# Patient Record
Sex: FEMALE | Employment: OTHER | URBAN - METROPOLITAN AREA
[De-identification: names, ages, dates, MRNs, and addresses within clinical notes are randomized per-mention and may not be internally consistent; named-entity substitution may affect disease eponyms.]

---

## 2020-01-13 ENCOUNTER — OFFICE VISIT (OUTPATIENT)
Dept: FAMILY MEDICINE | Facility: CLINIC | Age: 65
End: 2020-01-13
Payer: COMMERCIAL

## 2020-01-13 ENCOUNTER — TELEPHONE (OUTPATIENT)
Dept: SURGERY | Facility: CLINIC | Age: 65
End: 2020-01-13

## 2020-01-13 VITALS
SYSTOLIC BLOOD PRESSURE: 168 MMHG | OXYGEN SATURATION: 99 % | BODY MASS INDEX: 23.73 KG/M2 | TEMPERATURE: 98.1 F | RESPIRATION RATE: 18 BRPM | HEART RATE: 70 BPM | HEIGHT: 64 IN | WEIGHT: 139 LBS | DIASTOLIC BLOOD PRESSURE: 65 MMHG

## 2020-01-13 DIAGNOSIS — Z00.00 ROUTINE GENERAL MEDICAL EXAMINATION AT A HEALTH CARE FACILITY: Primary | ICD-10-CM

## 2020-01-13 DIAGNOSIS — Z12.11 SCREEN FOR COLON CANCER: ICD-10-CM

## 2020-01-13 DIAGNOSIS — E78.5 HYPERLIPIDEMIA WITH TARGET LDL LESS THAN 130: ICD-10-CM

## 2020-01-13 DIAGNOSIS — Z83.3 FAMILY HISTORY OF DIABETES MELLITUS: ICD-10-CM

## 2020-01-13 DIAGNOSIS — I10 ESSENTIAL HYPERTENSION WITH GOAL BLOOD PRESSURE LESS THAN 140/90: ICD-10-CM

## 2020-01-13 DIAGNOSIS — Z12.31 ENCOUNTER FOR SCREENING MAMMOGRAM FOR BREAST CANCER: ICD-10-CM

## 2020-01-13 LAB
ALBUMIN SERPL-MCNC: 4 G/DL (ref 3.4–5)
ALP SERPL-CCNC: 68 U/L (ref 40–150)
ALT SERPL W P-5'-P-CCNC: 70 U/L (ref 0–50)
ANION GAP SERPL CALCULATED.3IONS-SCNC: 6 MMOL/L (ref 3–14)
AST SERPL W P-5'-P-CCNC: 37 U/L (ref 0–45)
BILIRUB SERPL-MCNC: 0.8 MG/DL (ref 0.2–1.3)
BUN SERPL-MCNC: 16 MG/DL (ref 7–30)
CALCIUM SERPL-MCNC: 8.7 MG/DL (ref 8.5–10.1)
CHLORIDE SERPL-SCNC: 106 MMOL/L (ref 94–109)
CHOLEST SERPL-MCNC: 302 MG/DL
CO2 SERPL-SCNC: 25 MMOL/L (ref 20–32)
CREAT SERPL-MCNC: 0.51 MG/DL (ref 0.52–1.04)
ERYTHROCYTE [DISTWIDTH] IN BLOOD BY AUTOMATED COUNT: 12.2 % (ref 10–15)
GFR SERPL CREATININE-BSD FRML MDRD: >90 ML/MIN/{1.73_M2}
GLUCOSE SERPL-MCNC: 94 MG/DL (ref 70–99)
HBA1C MFR BLD: 5.7 % (ref 0–5.6)
HCT VFR BLD AUTO: 42.3 % (ref 35–47)
HDLC SERPL-MCNC: 86 MG/DL
HGB BLD-MCNC: 13.8 G/DL (ref 11.7–15.7)
LDLC SERPL CALC-MCNC: 182 MG/DL
MCH RBC QN AUTO: 31 PG (ref 26.5–33)
MCHC RBC AUTO-ENTMCNC: 32.6 G/DL (ref 31.5–36.5)
MCV RBC AUTO: 95 FL (ref 78–100)
NONHDLC SERPL-MCNC: 216 MG/DL
PLATELET # BLD AUTO: 204 10E9/L (ref 150–450)
POTASSIUM SERPL-SCNC: 4.1 MMOL/L (ref 3.4–5.3)
PROT SERPL-MCNC: 7.8 G/DL (ref 6.8–8.8)
RBC # BLD AUTO: 4.45 10E12/L (ref 3.8–5.2)
SODIUM SERPL-SCNC: 137 MMOL/L (ref 133–144)
TRIGL SERPL-MCNC: 169 MG/DL
TSH SERPL DL<=0.005 MIU/L-ACNC: 3.02 MU/L (ref 0.4–4)
WBC # BLD AUTO: 4.7 10E9/L (ref 4–11)

## 2020-01-13 PROCEDURE — 36415 COLL VENOUS BLD VENIPUNCTURE: CPT | Performed by: NURSE PRACTITIONER

## 2020-01-13 PROCEDURE — 83036 HEMOGLOBIN GLYCOSYLATED A1C: CPT | Performed by: NURSE PRACTITIONER

## 2020-01-13 PROCEDURE — 84443 ASSAY THYROID STIM HORMONE: CPT | Performed by: NURSE PRACTITIONER

## 2020-01-13 PROCEDURE — 80061 LIPID PANEL: CPT | Performed by: NURSE PRACTITIONER

## 2020-01-13 PROCEDURE — 80053 COMPREHEN METABOLIC PANEL: CPT | Performed by: NURSE PRACTITIONER

## 2020-01-13 PROCEDURE — 99213 OFFICE O/P EST LOW 20 MIN: CPT | Mod: 25 | Performed by: NURSE PRACTITIONER

## 2020-01-13 PROCEDURE — 85027 COMPLETE CBC AUTOMATED: CPT | Performed by: NURSE PRACTITIONER

## 2020-01-13 PROCEDURE — 99386 PREV VISIT NEW AGE 40-64: CPT | Performed by: NURSE PRACTITIONER

## 2020-01-13 RX ORDER — LOSARTAN POTASSIUM 25 MG/1
25 TABLET ORAL DAILY
Qty: 30 TABLET | Refills: 0 | Status: SHIPPED | OUTPATIENT
Start: 2020-01-13 | End: 2020-03-06

## 2020-01-13 ASSESSMENT — PAIN SCALES - GENERAL: PAINLEVEL: NO PAIN (0)

## 2020-01-13 ASSESSMENT — MIFFLIN-ST. JEOR: SCORE: 1171.53

## 2020-01-13 NOTE — LETTER
[address]  [address]  [phone]    January 14, 2020      Carolynn Floyd  [address] [facility] [address]          Ms. Floyd,     Your LDL (bad cholesterol)  was above goal.  Genetics, diet, weight and low exercise levels can contribute to this. Your HDL (good cholesterol) was normal.  This is good. Your triglycerides were above normal.  Poor diet, genetics and being overweight can contribute to this.  1000mg daily of omega-3 fatty acids may improve this. Elevated LDL cholesterol and triglycerides as well as low HDL cholesterol all increase a person's risk for heart and vascular disease. Maintaining a healthy diet with lean proteins, whole grains and healthy fats such as olive oil as well as regular exercise and maintaining an appropriate weight all contribute to healthier cholesterol levels.  I recommend rechecking your cholesterol in about 4 months and if no improvement starting on medication.     Your A1c is slightly high, you are considered prediabetic.  Eating healthy and exercising can help improve this.     Please do not forget to follow-up in 1 month for follow-up of your blood pressure.     Please contact the clinic if you have additional questions.  Thank you.     Sincerely,     RACHEL Pope, NP-C   [facility]

## 2020-01-13 NOTE — PROGRESS NOTES
SUBJECTIVE:   CC: Carolynn Floyd is an 64 year old woman who presents for preventive health visit.     Healthy Habits:    Do you get at least three servings of calcium containing foods daily (dairy, green leafy vegetables, etc.)? yes    Amount of exercise or daily activities, outside of work: none outside of daily activity    Problems taking medications regularly not applicable    Medication side effects: No    Have you had an eye exam in the past two years? no    Do you see a dentist twice per year? yes    Do you have sleep apnea, excessive snoring or daytime drowsiness?no    Feels everyone has diabetes in her family.  There are also multiple people who have had strokes in her family including her father who is had about 3.    No calcium/vit d. Doesn't like taking pills.    Pap smears all normal in the past. Last 11/2016, not due yet  Mammogram due  Colonoscopy due  DEXA due    HTN: no headaches/dizziness. Not taking lisinopril. She states when she was taking the lisinopril she is getting throat itching and did not like how she felt.  So she did not take it for long after being prescribed a couple of years ago.  Her blood pressure recheck was still high.  We talked about needing to reduce her risk of stroke and heart attack by lowering her blood pressure.  Especially with a strong family history of stroke and diabetes in her family.  We are going to screen her today for diabetes.  She states that she verbalized understanding, encouraged patient to come back in 4 weeks for blood pressure follow-up.    She has stated a couple times throughout the visit that money is tight.  Advised to her that it may become more expensive for her if something occurs like a stroke then to take a daily blood pressure medication at this time.  Advised that was very important to her health to control her blood pressure.  Uncertain if she will take this medication and or follow-up in 4 weeks.    Has a slight cough today, breathing  okay.  No fever or chills. Some joint pain at times but this is likely unrelated to her slight cough.    Today's PHQ-2 Score:   PHQ-2 ( 1999 Pfizer) 1/13/2020 11/7/2016   Q1: Little interest or pleasure in doing things 0 0   Q2: Feeling down, depressed or hopeless 0 0   PHQ-2 Score 0 0       Abuse: Current or Past(Physical, Sexual or Emotional)- No  Do you feel safe in your environment? Yes    Have you ever done Advance Care Planning? (For example, a Health Directive, POLST, or a discussion with a medical provider or your loved ones about your wishes): No, advance care planning information given to patient to review.  Patient plans to discuss their wishes with loved ones or provider.      Social History     Tobacco Use     Smoking status: Never Smoker     Smokeless tobacco: Never Used   Substance Use Topics     Alcohol use: Yes     Comment: 1-2 glasses of wine on the weekends     If you drink alcohol do you typically have >3 drinks per day or >7 drinks per week? No                     Reviewed orders with patient.  Reviewed health maintenance and updated orders accordingly - Yes  Lab work is in process  Labs reviewed in [facility]    Mammogram Screening: Patient over age 50, mutual decision to screen reflected in health maintenance.    Pertinent mammograms are reviewed under the imaging tab.  History of abnormal Pap smear: NO - age 30-65 PAP every 5 years with negative HPV co-testing recommended  PAP / HPV Latest Ref Rng & Units 11/7/2016   PAP - NIL   HPV 16 DNA NEG Negative   HPV 18 DNA NEG Negative   OTHER HR HPV NEG Negative     Reviewed and updated as needed this visit by clinical staff  Tobacco  Allergies  Meds  Med Hx  Surg Hx  Fam Hx  Soc Hx        Reviewed and updated as needed this visit by Provider            ROS:  CONSTITUTIONAL: NEGATIVE for fever, chills, change in weight  INTEGUMENTARY/SKIN: NEGATIVE for worrisome rashes, moles or lesions  EYES: NEGATIVE for vision changes or irritation  ENT: NEGATIVE  "for ear, mouth and throat problems  RESP: NEGATIVE for significant cough or SOB  BREAST: NEGATIVE for masses, tenderness or discharge  CV: NEGATIVE for chest pain, palpitations or peripheral edema  GI: NEGATIVE for nausea, abdominal pain, heartburn, or change in bowel habits  : NEGATIVE for unusual urinary or vaginal symptoms. No vaginal bleeding.  MUSCULOSKELETAL: NEGATIVE for significant arthralgias or myalgia  NEURO: NEGATIVE for weakness, dizziness or paresthesias  PSYCHIATRIC: NEGATIVE for changes in mood or affect     OBJECTIVE:   BP (!) 197/100 (BP Location: Right arm, Patient Position: Chair, Cuff Size: Adult Regular)   Pulse 70   Temp 98.1  F (36.7  C) (Oral)   Resp 18   Ht 1.619 m (5' 3.75\")   Wt 63 kg (139 lb)   LMP  (Exact Date)   SpO2 99%   Breastfeeding No   BMI 24.05 kg/m    EXAM:  GENERAL: healthy, alert and no distress  EYES: Eyes grossly normal to inspection, PERRL and conjunctivae and sclerae normal  HENT: ear canals and TM's normal, nose and mouth without ulcers or lesions  NECK: no adenopathy, no asymmetry, masses, or scars and thyroid normal to palpation  RESP: lungs clear to auscultation - no rales, rhonchi or wheezes  BREAST: implants noted-slightly firm, without masses, tenderness or nipple discharge and no palpable axillary masses or adenopathy  CV: regular rate and rhythm, normal S1 S2, no S3 or S4, no murmur, click or rub  ABDOMEN: soft, nontender, no hepatosplenomegaly, no masses and bowel sounds normal  MS: no gross musculoskeletal defects noted, no edema  SKIN: no suspicious lesions or rashes  NEURO: Normal strength and tone, mentation intact and speech normal  PSYCH: mentation appears normal, affect normal/bright    Diagnostic Test Results:  Labs reviewed in Epic  No results found for this or any previous visit (from the past 24 hour(s)).    ASSESSMENT/PLAN:   1. Routine general medical examination at a health care facility  Normal exam  - CBC with platelets  - Lipid panel " "reflex to direct LDL Fasting    2. Essential hypertension with goal blood pressure less than 140/90  Repeat blood pressure is still very high.  She never was on the lisinopril very long back in 2016 but states that she had some throat itching with it.  Will start losartan 25 mg daily.  Advised her to follow-up in 4 weeks.  Advised her to call if this is not working well for her in the next week or two.  Could then consider something like amlodipine 5 mg daily.  Highly advised her to consider taking this medication as she had stated before she doesn't like taking medications.  Advised that it is much cheaper to take the medication then to worry about possible stroke or heart attack in the future from having high blood pressure for extensive amount of time.  She verbalized understanding, although unsure if she truly understood or not.    - Comprehensive metabolic panel (BMP + Alb, Alk Phos, ALT, AST, Total. Bili, TP)  - TSH with free T4 reflex  - losartan (COZAAR) 25 MG tablet; Take 1 tablet (25 mg) by mouth daily  Dispense: 30 tablet; Refill: 0    3. Family history of diabetes mellitus  Patient has strong family history of diabetes, will screen her A1c today.  - Hemoglobin A1c    4. Encounter for screening mammogram for breast cancer  Due for screening  - MA SCREENING DIGITAL BILAT - Future  (s+30); Future    5. Hyperlipidemia with target LDL less than 130  Screening, see above    6.  Screening for colon cancer  -Order placed to have colonoscopy.  Patient is due as she had adenomas noted in her last colonoscopy in November 2016.    COUNSELING:   Reviewed preventive health counseling, as reflected in patient instructions    Estimated body mass index is 24.05 kg/m  as calculated from the following:    Height as of this encounter: 1.619 m (5' 3.75\").    Weight as of this encounter: 63 kg (139 lb).         reports that she has never smoked. She has never used smokeless tobacco.      Counseling Resources:  ATP IV " Guidelines  Pooled Cohorts Equation Calculator  Breast Cancer Risk Calculator  FRAX Risk Assessment  ICSI Preventive Guidelines  Dietary Guidelines for Americans, 2010  USDA's MyPlate  ASA Prophylaxis  Lung CA Screening    Follow-up in 4 weeks for blood pressure check    RACHEL Pope, NP-C  [facility]    This chart was documented by provider using a voice activated software called Dragon in addition to manual typing. There may be vocabulary errors or other grammatical errors due to this.

## 2020-01-13 NOTE — TELEPHONE ENCOUNTER
[place] [facility]  Is this a cancellation or reschedule?  no  The name of the procedure: Colonoscopy  Provider scheduled with: Kandace  Date 1/21/20, Time 10:00am

## 2020-01-21 ENCOUNTER — HOSPITAL ENCOUNTER (OUTPATIENT)
Facility: AMBULATORY SURGERY CENTER | Age: 65
Discharge: HOME | End: 2020-01-21
Attending: SURGERY | Admitting: SURGERY
Payer: COMMERCIAL

## 2020-01-21 VITALS
HEART RATE: 61 BPM | OXYGEN SATURATION: 99 % | DIASTOLIC BLOOD PRESSURE: 83 MMHG | TEMPERATURE: 97.2 F | SYSTOLIC BLOOD PRESSURE: 122 MMHG | RESPIRATION RATE: 16 BRPM

## 2020-01-21 LAB — COLONOSCOPY: NORMAL

## 2020-01-21 PROCEDURE — 45385 COLONOSCOPY W/LESION REMOVAL: CPT | Mod: PT | Performed by: SURGERY

## 2020-01-21 PROCEDURE — 99152 MOD SED SAME PHYS/QHP 5/>YRS: CPT | Mod: 59 | Performed by: SURGERY

## 2020-01-21 PROCEDURE — G8907 PT DOC NO EVENTS ON DISCHARG: HCPCS

## 2020-01-21 PROCEDURE — 45380 COLONOSCOPY AND BIOPSY: CPT | Mod: XS

## 2020-01-21 PROCEDURE — 45385 COLONOSCOPY W/LESION REMOVAL: CPT

## 2020-01-21 PROCEDURE — 45380 COLONOSCOPY AND BIOPSY: CPT | Mod: 59 | Performed by: SURGERY

## 2020-01-21 PROCEDURE — 88305 TISSUE EXAM BY PATHOLOGIST: CPT | Performed by: SURGERY

## 2020-01-21 PROCEDURE — G8918 PT W/O PREOP ORDER IV AB PRO: HCPCS

## 2020-01-21 RX ORDER — ONDANSETRON 4 MG/1
4 TABLET, ORALLY DISINTEGRATING ORAL EVERY 6 HOURS PRN
Status: DISCONTINUED | OUTPATIENT
Start: 2020-01-21 | End: 2020-01-22 | Stop reason: HOSPADM

## 2020-01-21 RX ORDER — FENTANYL CITRATE 50 UG/ML
INJECTION, SOLUTION INTRAMUSCULAR; INTRAVENOUS PRN
Status: DISCONTINUED | OUTPATIENT
Start: 2020-01-21 | End: 2020-01-21 | Stop reason: HOSPADM

## 2020-01-21 RX ORDER — LIDOCAINE 40 MG/G
CREAM TOPICAL
Status: DISCONTINUED | OUTPATIENT
Start: 2020-01-21 | End: 2020-01-22 | Stop reason: HOSPADM

## 2020-01-21 RX ORDER — ONDANSETRON 2 MG/ML
4 INJECTION INTRAMUSCULAR; INTRAVENOUS EVERY 6 HOURS PRN
Status: DISCONTINUED | OUTPATIENT
Start: 2020-01-21 | End: 2020-01-22 | Stop reason: HOSPADM

## 2020-01-21 RX ORDER — FLUMAZENIL 0.1 MG/ML
0.2 INJECTION, SOLUTION INTRAVENOUS
Status: SHIPPED | OUTPATIENT
Start: 2020-01-21 | End: 2020-01-21

## 2020-01-21 RX ORDER — NALOXONE HYDROCHLORIDE 0.4 MG/ML
.1-.4 INJECTION, SOLUTION INTRAMUSCULAR; INTRAVENOUS; SUBCUTANEOUS
Status: DISCONTINUED | OUTPATIENT
Start: 2020-01-21 | End: 2020-01-22 | Stop reason: HOSPADM

## 2020-01-21 RX ORDER — ONDANSETRON 2 MG/ML
4 INJECTION INTRAMUSCULAR; INTRAVENOUS
Status: DISCONTINUED | OUTPATIENT
Start: 2020-01-21 | End: 2020-01-22 | Stop reason: HOSPADM

## 2020-01-23 LAB — COPATH REPORT: NORMAL

## 2020-03-03 DIAGNOSIS — I10 ESSENTIAL HYPERTENSION WITH GOAL BLOOD PRESSURE LESS THAN 140/90: ICD-10-CM

## 2020-03-03 NOTE — TELEPHONE ENCOUNTER
"Requested Prescriptions   Pending Prescriptions Disp Refills     losartan (COZAAR) 25 MG tablet  Last Written Prescription Date:  1/13/20  Last Fill Quantity: 30 tablet,  # refills: 0   Last office visit: 1/13/2020 with prescribing provider:  Sheila Jimenez NP   Future Office Visit:   30 tablet 0     Sig: Take 1 tablet (25 mg) by mouth daily       Angiotensin-II Receptors Failed - 3/3/2020 11:36 AM        Failed - Normal serum creatinine on file in past 12 months     Recent Labs   Lab Test 01/13/20  0836   CR 0.51*             Passed - Last blood pressure under 140/90 in past 12 months     BP Readings from Last 3 Encounters:   01/21/20 122/83   01/13/20 (!) 168/65   11/16/16 144/90                 Passed - Recent (12 mo) or future (30 days) visit within the authorizing provider's specialty     Patient has had an office visit with the authorizing provider or a provider within the authorizing providers department within the previous 12 mos or has a future within next 30 days. See \"Patient Info\" tab in inbasket, or \"Choose Columns\" in Meds & Orders section of the refill encounter.              Passed - Medication is active on med list        Passed - Patient is age 18 or older        Passed - No active pregnancy on record        Passed - Normal serum potassium on file in past 12 months     Recent Labs   Lab Test 01/13/20  0836   POTASSIUM 4.1                    Passed - No positive pregnancy test in past 12 months          "

## 2020-03-03 NOTE — LETTER
[facility]  [address] [place] [address]  [address]  [phone]    March 9, 2020      Carolynn Floyd  [address] [place] [address]      Dear Carolynn,    We have refilled your losartan for 15 days.   We will need to see you for an office visit before any additional refills can be given.  Please call [phone] to schedule this appointment.      Thank you,    [facility]      Thank you,      Sheila Jimenez NP

## 2020-03-06 RX ORDER — LOSARTAN POTASSIUM 25 MG/1
25 TABLET ORAL DAILY
Qty: 15 TABLET | Refills: 0 | Status: SHIPPED | OUTPATIENT
Start: 2020-03-06 | End: 2020-04-20

## 2020-03-06 NOTE — TELEPHONE ENCOUNTER
Sent small refill, please call patient advise she is overdue for follow up in person with a provider as her blood pressure was very high and concerning in January.  RACHEL Pope, NP-C  [facility]

## 2020-03-06 NOTE — TELEPHONE ENCOUNTER
Routing refill request to provider for review/approval because:  New medication started on 1/13/20, only given #30 tabs so would have been out by mid February. Was advised to return for BP check and appears has not done so as of yet.    Haliegh Marinelli RN  [facility]

## 2020-03-06 NOTE — TELEPHONE ENCOUNTER
This writer attempted to contact pt via Ugandan interpretor  on 03/06/20      Reason for call schedule OV for further refill, small refill sent and left message.      If patient calls back:   Schedule Office Visit appointment within 2 weeks with primary care, document that pt called and close encounter         Janessa Maki

## 2020-04-20 ENCOUNTER — VIRTUAL VISIT (OUTPATIENT)
Dept: FAMILY MEDICINE | Facility: CLINIC | Age: 65
End: 2020-04-20
Payer: COMMERCIAL

## 2020-04-20 DIAGNOSIS — I10 ESSENTIAL HYPERTENSION WITH GOAL BLOOD PRESSURE LESS THAN 140/90: ICD-10-CM

## 2020-04-20 PROCEDURE — 99213 OFFICE O/P EST LOW 20 MIN: CPT | Mod: 95 | Performed by: NURSE PRACTITIONER

## 2020-04-20 RX ORDER — LOSARTAN POTASSIUM 25 MG/1
25 TABLET ORAL DAILY
Qty: 15 TABLET | Refills: 0 | Status: CANCELLED | OUTPATIENT
Start: 2020-04-20

## 2020-04-20 RX ORDER — LOSARTAN POTASSIUM 25 MG/1
25 TABLET ORAL DAILY
Qty: 90 TABLET | Refills: 0 | Status: SHIPPED | OUTPATIENT
Start: 2020-04-20 | End: 2020-07-16

## 2020-04-20 NOTE — PATIENT INSTRUCTIONS
Continue losartan 25 mg daily-3 month refill sent to your pharmacy    Check blood pressure/heart rate when you  your medication, call provider with those numbers    Make phone appointment in 1 month, if the COVID-19 pandemic is settled down in [place], we will change this to in-person, but for now we will do phone visit    Call if your heart rating worsens or you get dizzy/headaches/fever/chills or call if you have any other questions

## 2020-04-20 NOTE — PROGRESS NOTES
"Carolynn Floyd is a 64 year old female who is being evaluated via a billable telephone visit.      The patient has been notified of following:     \"This telephone visit will be conducted via a call between you and your physician/provider. We have found that certain health care needs can be provided without the need for a physical exam.  This service lets us provide the care you need with a short phone conversation.  If a prescription is necessary we can send it directly to your pharmacy.  If lab work is needed we can place an order for that and you can then stop by our lab to have the test done at a later time.    Telephone visits are billed at different rates depending on your insurance coverage. During this emergency period, for some insurers they may be billed the same as an in-person visit.  Please reach out to your insurance provider with any questions.    If during the course of the call the physician/provider feels a telephone visit is not appropriate, you will not be charged for this service.\"    Patient has given verbal consent for Telephone visit?  Yes    How would you like to obtain your AVS? Mail a copy    Subjective     Carolynn Floyd is a 64 year old female who presents to clinic today for the following health issues:    HTN recheck: a little cough.  No headache, dizziness, no chest pain, no SOB. Sometimes her heart feels like it is racing. Bowel/bladder normal. No fever/cough      Patient Active Problem List   Diagnosis     CARDIOVASCULAR SCREENING; LDL GOAL LESS THAN 160     Essential hypertension with goal blood pressure less than 140/90     Hyperlipidemia with target LDL less than 130     Past Surgical History:   Procedure Laterality Date     COLONOSCOPY      2009     COLONOSCOPY WITH CO2 INSUFFLATION N/A 11/9/2016    Procedure: COLONOSCOPY WITH CO2 INSUFFLATION;  Surgeon: Duane, William Charles, MD;  Location:  OR       Social History     Tobacco Use     Smoking status: Never Smoker     " Smokeless tobacco: Never Used   Substance Use Topics     Alcohol use: Yes     Comment: 1-2 glasses of wine on the weekends     Family History   Problem Relation Age of Onset     Hypertension Father      Diabetes Father      Cerebrovascular Disease Father      Cerebrovascular Disease Paternal Grandmother      Thyroid Disease Daughter      Diabetes Sister      Colon Cancer No family hx of      Breast Cancer No family hx of          Current Outpatient Medications   Medication Sig Dispense Refill     losartan (COZAAR) 25 MG tablet Take 1 tablet (25 mg) by mouth daily 90 tablet 0     Allergies   Allergen Reactions     Lisinopril      Throat itching       Reviewed and updated as needed this visit by Provider         Review of Systems   ROS COMP: Constitutional, HEENT, cardiovascular, pulmonary, systems are negative, except as otherwise noted.       Objective   Reported vitals:  There were no vitals taken for this visit.   healthy, alert and no distress  PSYCH: Alert and oriented times 3; coherent speech, normal   rate and volume, able to articulate logical thoughts, able   to abstract reason, no tangential thoughts, no hallucinations   or delusions  Her affect is normal and pleasant  RESP: No cough, no audible wheezing, able to talk in full sentences  Remainder of exam unable to be completed due to telephone visits    Diagnostic Test Results:  Labs reviewed in Epic        Assessment/Plan:  1. Essential hypertension with goal blood pressure less than 140/90  Continue same medication, do phone visit in 1 month, in person if COVID-19 is settling down  - losartan (COZAAR) 25 MG tablet; Take 1 tablet (25 mg) by mouth daily  Dispense: 90 tablet; Refill: 0    Return in about 4 weeks (around 5/18/2020) for Hypertension Recheck, E-visit or Phone visit okay.      Phone call duration:  8 minutes    RACHEL Pope, NP-C  [facility]

## 2020-04-20 NOTE — TELEPHONE ENCOUNTER
"Requested Prescriptions   Pending Prescriptions Disp Refills     losartan (COZAAR) 25 MG tablet 15 tablet 0     Sig: Take 1 tablet (25 mg) by mouth daily       Angiotensin-II Receptors Failed - 4/20/2020  9:15 AM        Failed - Normal serum creatinine on file in past 12 months     Recent Labs   Lab Test 01/13/20  0836   CR 0.51*       Ok to refill medication if creatinine is low          Passed - Last blood pressure under 140/90 in past 12 months     BP Readings from Last 3 Encounters:   01/21/20 122/83   01/13/20 (!) 168/65   11/16/16 144/90                 Passed - Recent (12 mo) or future (30 days) visit within the authorizing provider's specialty     Patient has had an office visit with the authorizing provider or a provider within the authorizing providers department within the previous 12 mos or has a future within next 30 days. See \"Patient Info\" tab in inbasket, or \"Choose Columns\" in Meds & Orders section of the refill encounter.              Passed - Medication is active on med list        Passed - Patient is age 18 or older        Passed - No active pregnancy on record        Passed - Normal serum potassium on file in past 12 months     Recent Labs   Lab Test 01/13/20  0836   POTASSIUM 4.1                    Passed - No positive pregnancy test in past 12 months           losartan (COZAAR) 25 MG tablet  Last Written Prescription Date:  3/6/2020  Last Fill Quantity: 15,  # refills: 0   Last office visit: 1/13/2020 with prescribing provider:  Sheila Jimenez   Future Office Visit:   Next 5 appointments (look out 90 days)    Apr 20, 2020  9:40 AM CDT  [facility]phone Visit with [facility]enez NP  [facility] [address] [facility] [address] [phone]           "

## 2020-07-13 DIAGNOSIS — I10 ESSENTIAL HYPERTENSION WITH GOAL BLOOD PRESSURE LESS THAN 140/90: ICD-10-CM

## 2020-07-13 NOTE — LETTER
[facility]  [address] [place] [address]  [address]  [phone]    July 17, 2020      Carolynn Floyd  [address] [place] [address]      Dear Carolynn,    We have refilled your Losartan for one month.   We will need to see you for lab work and a virtual visit, before any additional refills can be given.  Please call [phone] to schedule this appointment.      Thank you,    [facility]

## 2020-07-15 NOTE — TELEPHONE ENCOUNTER
Routing refill request to provider for review/approval because:  Labs out of range:  SCr  Due for office visit

## 2020-07-16 RX ORDER — LOSARTAN POTASSIUM 25 MG/1
25 TABLET ORAL DAILY
Qty: 30 TABLET | Refills: 0 | Status: SHIPPED | OUTPATIENT
Start: 2020-07-16 | End: 2020-08-06

## 2020-08-03 DIAGNOSIS — I10 ESSENTIAL HYPERTENSION WITH GOAL BLOOD PRESSURE LESS THAN 140/90: ICD-10-CM

## 2020-08-03 NOTE — TELEPHONE ENCOUNTER
"Requested Prescriptions   Pending Prescriptions Disp Refills     losartan (COZAAR) 25 MG tablet 30 tablet 0     Sig: Take 1 tablet (25 mg) by mouth daily *due for virtual visit       Angiotensin-II Receptors Failed - 8/3/2020  2:38 PM        Failed - Normal serum creatinine on file in past 12 months     Recent Labs   Lab Test 01/13/20  0836   CR 0.51*       Ok to refill medication if creatinine is low          Passed - Last blood pressure under 140/90 in past 12 months     BP Readings from Last 3 Encounters:   01/21/20 122/83   01/13/20 (!) 168/65   11/16/16 144/90                 Passed - Recent (12 mo) or future (30 days) visit within the authorizing provider's specialty     Patient has had an office visit with the authorizing provider or a provider within the authorizing providers department within the previous 12 mos or has a future within next 30 days. See \"Patient Info\" tab in inbasket, or \"Choose Columns\" in Meds & Orders section of the refill encounter.              Passed - Medication is active on med list        Passed - Patient is age 18 or older        Passed - No active pregnancy on record        Passed - Normal serum potassium on file in past 12 months     Recent Labs   Lab Test 01/13/20  0836   POTASSIUM 4.1                    Passed - No positive pregnancy test in past 12 months           losartan (COZAAR) 25 MG tablet  Last Written Prescription Date:  7/16/2020  Last Fill Quantity: 30,  # refills: 0   Last office visit: 1/13/2020 with prescribing provider:  Sheila Jimenez   Future Office Visit:            "

## 2020-08-05 NOTE — TELEPHONE ENCOUNTER
Routing refill request to provider for review/approval because:  Labs not current:  Creatinine    Allyssa Becerra RN, [facility] Triage

## 2020-08-06 ENCOUNTER — VIRTUAL VISIT (OUTPATIENT)
Dept: FAMILY MEDICINE | Facility: CLINIC | Age: 65
End: 2020-08-06
Payer: COMMERCIAL

## 2020-08-06 DIAGNOSIS — I10 ESSENTIAL HYPERTENSION WITH GOAL BLOOD PRESSURE LESS THAN 140/90: Primary | ICD-10-CM

## 2020-08-06 DIAGNOSIS — R73.03 PREDIABETES: ICD-10-CM

## 2020-08-06 DIAGNOSIS — E78.5 HYPERLIPIDEMIA WITH TARGET LDL LESS THAN 130: ICD-10-CM

## 2020-08-06 PROCEDURE — 99213 OFFICE O/P EST LOW 20 MIN: CPT | Mod: 95 | Performed by: NURSE PRACTITIONER

## 2020-08-06 RX ORDER — LOSARTAN POTASSIUM 25 MG/1
25 TABLET ORAL DAILY
Qty: 15 TABLET | Refills: 0 | Status: SHIPPED | OUTPATIENT
Start: 2020-08-06 | End: 2020-08-06

## 2020-08-06 RX ORDER — LOSARTAN POTASSIUM 25 MG/1
25 TABLET ORAL DAILY
Qty: 30 TABLET | Refills: 0 | Status: SHIPPED | OUTPATIENT
Start: 2020-08-06 | End: 2020-09-18

## 2020-08-06 NOTE — PATIENT INSTRUCTIONS
Make appointment with the nurse and lab to get blood pressure and heart rate checked plus labs done. [facility] has outside tent to do this if you prefer, otherwise any [place] or [place] clinic is okay    For the labs, nothing to eat or drink for 8-10 hours, water is okay.    Make sure you are drinking at least 8 glasses of water a day. Carry a water bottle around. Decrease alcohol intake to 0-1 glasses of wine a night.     Once we know how your blood pressure is doing we can give longer refills OR make dose adjustments to your medication. If we do not know where your blood pressure is, we may not be treating your blood pressure appropriately, this is why it is important to know where your blood pressure is. Especially since it was so high in January.      IF your blood pressure is too high, I will make adjustments to your medication and require a follow up blood pressure check and virtual visit with provider in 4 weeks. I will be in touch about this after I see what your blood pressure is.

## 2020-08-06 NOTE — PROGRESS NOTES
"Carolynn Floyd is a 64 year old female who is being evaluated via a billable telephone visit.      The patient has been notified of following:     \"This telephone visit will be conducted via a call between you and your physician/provider. We have found that certain health care needs can be provided without the need for a physical exam.  This service lets us provide the care you need with a short phone conversation.  If a prescription is necessary we can send it directly to your pharmacy.  If lab work is needed we can place an order for that and you can then stop by our lab to have the test done at a later time.    Telephone visits are billed at different rates depending on your insurance coverage. During this emergency period, for some insurers they may be billed the same as an in-person visit.  Please reach out to your insurance provider with any questions.    If during the course of the call the physician/provider feels a telephone visit is not appropriate, you will not be charged for this service.\"    Patient has given verbal consent for Telephone visit?  Yes    What phone number would you like to be contacted at? [phone]    How would you like to obtain your AVS? Mail a copy    Subjective     Carolynn Floyd is a 64 year old female who presents via phone visit today for the following health issues:    HPI    Hypertension Follow-up      Do you check your blood pressure regularly outside of the clinic? NO    Are you following a low salt diet? Yes    Are your blood pressures ever more than 140 on the top number (systolic) OR more   than 90 on the bottom number (diastolic), for example 140/90?  unknown    How many servings of fruits and vegetables do you eat daily?  2-3    On average, how many sweetened beverages do you drink each day (Examples: soda, juice, sweet tea, etc.  Do NOT count diet or artificially sweetened beverages)?    Some juice no soda    How many days per week do you exercise enough to make " your heart beat faster? 7    How many minutes a day do you exercise enough to make your heart beat faster? 60 or more    How many days per week do you miss taking your medication? 0    Doesn't check her blood pressure at home. No chest pain/SOB. Bowel/bladder is okay. Sometimes has muscle cramping at night.  Denies eating rice or pasta. Eats a lot of veggies. She states she drinks wine not water. Drinks about 2 glasses of wine at night. She drinks coffee during the day. She says when she is working out side she drinks water. No swelling in legs. She states she feels healthy.    Provider tried multiple times to tell patient the importance of checking her blood pressure and knowing what the numbers are. Also highly advised to come get it checked in clinic.  In January her blood pressure was very high.  She did not follow-up within 4 weeks that was recommended.  It was advised if her blood pressure is not improved we need to make dose adjustments and have her follow-up again.  But if her blood pressure is stable we can give further refills than just 30 days.  We will mail her AVS so she can make an appointment with the nurse and get blood work done.          Patient Active Problem List   Diagnosis     CARDIOVASCULAR SCREENING; LDL GOAL LESS THAN 160     Essential hypertension with goal blood pressure less than 140/90     Hyperlipidemia with target LDL less than 130     Prediabetes     Past Surgical History:   Procedure Laterality Date     COLONOSCOPY      2009     COLONOSCOPY WITH CO2 INSUFFLATION N/A 11/9/2016    Procedure: COLONOSCOPY WITH CO2 INSUFFLATION;  Surgeon: Duane, William Charles, MD;  Location:  OR       Social History     Tobacco Use     Smoking status: Never Smoker     Smokeless tobacco: Never Used   Substance Use Topics     Alcohol use: Yes     Comment: 1-2 glasses of wine on the weekends     Family History   Problem Relation Age of Onset     Hypertension Father      Diabetes Father       Cerebrovascular Disease Father      Cerebrovascular Disease Paternal Grandmother      Thyroid Disease Daughter      Diabetes Sister      Colon Cancer No family hx of      Breast Cancer No family hx of          Current Outpatient Medications   Medication Sig Dispense Refill     losartan (COZAAR) 25 MG tablet Take 1 tablet (25 mg) by mouth daily *due for virtual visit and BP/HR check with nurse and labs 30 tablet 0     Allergies   Allergen Reactions     Lisinopril      Throat itching       Reviewed and updated as needed this visit by Provider         Review of Systems   Constitutional, HEENT, cardiovascular, pulmonary, gi and gu systems are negative, except as otherwise noted.       Objective   Reported vitals:  There were no vitals taken for this visit.   healthy, alert and no distress  PSYCH: Alert and oriented times 3; coherent speech, normal   rate and volume, able to articulate logical thoughts, able   to abstract reason, no tangential thoughts, no hallucinations   or delusions  Her affect is normal  RESP: No cough, no audible wheezing, able to talk in full sentences  Remainder of exam unable to be completed due to telephone visits    Diagnostic Test Results:  Labs reviewed in Epic        Assessment/Plan:    1. Essential hypertension with goal blood pressure less than 140/90  1 month refill given.  Unknown what blood pressure is, patient is to make a nurse visit to get her blood pressure and heart rate checked.  She is also due for blood work at that time.  If blood pressure is high would increase medication, then have follow-up in 4 weeks.  If it is stable okay to give 90-day refill and have follow-up in 3 months.  - losartan (COZAAR) 25 MG tablet; Take 1 tablet (25 mg) by mouth daily *due for virtual visit and BP/HR check with nurse and labs  Dispense: 30 tablet; Refill: 0  - **Comprehensive metabolic panel FUTURE anytime; Future    2. Prediabetes  A1c in January was prediabetic, will screen again today.  She  does have family history of diabetes  - Hemoglobin A1c; Future    3. Hyperlipidemia with target LDL less than 130  Blood pressure was quite high in January, recommend rescreening and would recommend starting medication if still high  - Lipid panel reflex to direct LDL Fasting; Future    No follow-ups on file.      Phone call duration:  14 minutes    RACHEL Pope, NP-C  [facility]

## 2020-08-15 DIAGNOSIS — E78.5 HYPERLIPIDEMIA WITH TARGET LDL LESS THAN 130: ICD-10-CM

## 2020-08-15 DIAGNOSIS — I10 ESSENTIAL HYPERTENSION WITH GOAL BLOOD PRESSURE LESS THAN 140/90: ICD-10-CM

## 2020-08-15 DIAGNOSIS — R73.03 PREDIABETES: ICD-10-CM

## 2020-08-15 LAB — HBA1C MFR BLD: 5.5 % (ref 0–5.6)

## 2020-08-15 PROCEDURE — 80053 COMPREHEN METABOLIC PANEL: CPT | Performed by: NURSE PRACTITIONER

## 2020-08-15 PROCEDURE — 83036 HEMOGLOBIN GLYCOSYLATED A1C: CPT | Performed by: NURSE PRACTITIONER

## 2020-08-15 PROCEDURE — 80061 LIPID PANEL: CPT | Performed by: NURSE PRACTITIONER

## 2020-08-15 PROCEDURE — 36415 COLL VENOUS BLD VENIPUNCTURE: CPT | Performed by: NURSE PRACTITIONER

## 2020-08-17 LAB
ALBUMIN SERPL-MCNC: 4.1 G/DL (ref 3.4–5)
ALP SERPL-CCNC: 52 U/L (ref 40–150)
ALT SERPL W P-5'-P-CCNC: 50 U/L (ref 0–50)
ANION GAP SERPL CALCULATED.3IONS-SCNC: 8 MMOL/L (ref 3–14)
AST SERPL W P-5'-P-CCNC: 29 U/L (ref 0–45)
BILIRUB SERPL-MCNC: 0.8 MG/DL (ref 0.2–1.3)
BUN SERPL-MCNC: 12 MG/DL (ref 7–30)
CALCIUM SERPL-MCNC: 8.6 MG/DL (ref 8.5–10.1)
CHLORIDE SERPL-SCNC: 104 MMOL/L (ref 94–109)
CHOLEST SERPL-MCNC: 269 MG/DL
CO2 SERPL-SCNC: 25 MMOL/L (ref 20–32)
CREAT SERPL-MCNC: 0.57 MG/DL (ref 0.52–1.04)
GFR SERPL CREATININE-BSD FRML MDRD: >90 ML/MIN/{1.73_M2}
GLUCOSE SERPL-MCNC: 98 MG/DL (ref 70–99)
HDLC SERPL-MCNC: 83 MG/DL
LDLC SERPL CALC-MCNC: 161 MG/DL
NONHDLC SERPL-MCNC: 186 MG/DL
POTASSIUM SERPL-SCNC: 3.8 MMOL/L (ref 3.4–5.3)
PROT SERPL-MCNC: 7.4 G/DL (ref 6.8–8.8)
SODIUM SERPL-SCNC: 137 MMOL/L (ref 133–144)
TRIGL SERPL-MCNC: 125 MG/DL

## 2020-08-17 NOTE — RESULT ENCOUNTER NOTE
Send letter and results    Ms. Floyd,    Your cholesterol is slightly improved. I think we can hold off starting medication at this point.  Your A1C is below the pre-diabetes jude which is good. Your other labs are normal. Please make a nurse appointment to have a free blood pressure/heart rate check so we can ensure you are on the right dose of medication.     Please contact the clinic if you have additional questions.  Thank you.    Sincerely,    RACHEL Pope, NP-C  [facility]

## 2020-10-05 ENCOUNTER — NURSE TRIAGE (OUTPATIENT)
Dept: NURSING | Facility: CLINIC | Age: 65
End: 2020-10-05

## 2020-10-05 NOTE — TELEPHONE ENCOUNTER
Carolynn's daughter Do calls in distressed by her mother's virtual visits and medication refills for her hypertension  Medication  Losartan.  Carolynn has had to do several virtual visits since  April and only get 15 day refills for her prescription. It is a hardship for Carolynn to get out and get the medication and go to get her blood pressure done for these visit.  Do would like a call to discuss from Sheila Jimenez NP or her medical assistant.  Do  Can be  Reached on her cell phone at [phone].     Reason for Disposition    [1] Follow-up call from patient regarding patient's clinical status AND [2] information NON-URGENT    Protocols used: PCP CALL - NO TRIAGE-A-

## 2020-10-06 DIAGNOSIS — I10 ESSENTIAL HYPERTENSION WITH GOAL BLOOD PRESSURE LESS THAN 140/90: ICD-10-CM

## 2020-10-06 NOTE — TELEPHONE ENCOUNTER
Please call patient's daughter if there is documentation filled out that we can talk to her bethany. Otherwise call patient (she may need  for understanding).    Patient's last documented blood pressure and heart rate was in January 2020, it was very high at 197/100 and heart rate was normal at 70. From January to April she was given a month supply then two 15 day refills. Due to COVID-19, her virtual visit in April was given a longer refill (90 day) and she was told to follow up within 1 month, which she didn't. She then requested refills from June to August which we gave limited refills as she was overdue for blood pressure check and labs. Her labs in August were stable, but she said at her August appointment she was not checking her blood pressure, so again only a month refill was given at the August visit as we do not know if her current medication dose is working or not. If we cannot monitor blood pressure when on medication then we are unsure if we are managing her blood pressure appropriately. This is the reason for giving small refills. Due to COVID-19, if she can give us multiple reported blood pressures and heart rates that she has checked from home, we can use those to ensure she is stable on her blood pressure medication and give longer refills. Her next in person or virtual follow up for her blood pressure with provider is due now, unless she has stable blood pressures, then her next follow up would be November 2020.     Please route back to provider if further questions,   Thank you,  RACHEL Pope, NP-C  [facility]

## 2020-10-06 NOTE — TELEPHONE ENCOUNTER
This patient should go to the pharmacy or get a home blood pressure cuff and tell us the reading. With this an the labs we can give refills or change her dose as needed.    Patient can choose not to do this but in that case she would be restriction her ability to get her medication not us.    Jesica Mejia M.D.

## 2020-10-06 NOTE — TELEPHONE ENCOUNTER
Called and spoke with patient via  # [phone].    Call was over 20 minutes long. Patient continually interrupted the  and writer when trying to speak. Writer had a very difficult time trying communicate the information below to the patient due to frequent interruptions by patient. Writer asked  to tell patient to stop interrupting but patient still continued to interrupt writer.    When writer communicated some of the information to patient from provider she often spoke of what happened 3 years ago and at that time she was given larger supplies of medication. Writer was able to communicate only a small amount of the information below to patient as she ended the call prior to writer communicating all of the information below.  Writer was able to tell patient that in order for patient to continue to have steady refills of this medication she must periodically have a visit with a provider , have labs and blood pressure check.       Patient disagreed with the fact that she was given a 90 day  supply of medication on 4/20/20. Patient stated that she only got a part of that prescription. Writer told her that it may have been because the pharmacy was temporarily out of stock on that medication.     Patient stated at the end of the call that she will not make an apt,  and that she will not take the medication then if she needs an apt, and then if something happens to her because she is not taking the medication, her and her daughter are going to janene the clinic. Patient then disconnected the call.     Dee Mosley RN

## 2020-10-07 NOTE — TELEPHONE ENCOUNTER
Please call patient back, please use  if needed: labs are usually done every 6 months or so, unless something is abnormal then it can be more frequent. With her labs being stable in August, she can recheck them in February 2021. Virtual visits are dependent on how well or not well controlled a health problem is. With her blood pressure being so uncontrolled as of January and not knowing since then what her blood pressure is, it requires more frequent visits.  She can buy her own blood pressure cuff or have it done at a pharmacy that is near her home and let us know the numbers of her blood pressure and heart rate so we can be sure we are monitoring her blood pressure appropriately. Unless she had made a blood pressure check with a nurse plus a lab appointment, then it would not have been checked automatically with her lab draw.  If further questions please route to provider.    Thank you,  RACHEL Pope, NP-C  [facility]

## 2020-10-07 NOTE — TELEPHONE ENCOUNTER
"Called patient to follow up on the message her daughter left on the supervisor line. \"Daughter of the patient (Do) is calling in for the patient (limit english for the patient)  Has been taking Blood Pressure medication since April and they are wondering why each refill is for only 15 pills. Wondering why they can t have say 6 months with refills more than 15 pills at a time. Daughter is concerned because the patient is considering breaking the pills in half to make them last. Last time she came in for labs and blood pressure check the blood pressure check was not done and patient lives in [place] and this a far ride to be told just to swing in and it can be done. Patients number is [phone] daughter Do s Phone is [phone]  they would like a call with why only 15 pills a time or if this can be switched.\"    Spoke with patient- I read her verbatim Dr. Cecelia Parkinson's instructions as well as Sheila Jimenez's instructions regarding labs and a virtual visit. Pt stated she got labs done 8/15/2020 and that it costs a lot of money every time she has to be seen. She is wondering how often she is due for labs?     Pt declined to schedule labs or a virtual visit because she wants this clarified with provider first.     Will route to provider for follow up.     Maureen Hunt RN Patient Care Supervisor     "

## 2020-10-08 RX ORDER — LOSARTAN POTASSIUM 25 MG/1
25 TABLET ORAL DAILY
Qty: 60 TABLET | Refills: 0 | Status: SHIPPED | OUTPATIENT
Start: 2020-10-08 | End: 2020-12-07

## 2020-10-08 NOTE — TELEPHONE ENCOUNTER
Please let patient know provider sent in 2 month refill. She will need to fax us or send Mychart us her blood pressures and heart rate(if she has this) before further refills.   Thank you,  RACHEL Pope, NP-C  [facility]

## 2020-10-08 NOTE — TELEPHONE ENCOUNTER
Called and spoke with patient with a Romanian .     Provider message was relayed. Patient will fax in her BP readings that she has been tracking as she does have a home BP monitoring cuff.     Routing to provider to please advise on medication and if a high quantity is allowed.     Debbie Rollins RN  [facility]

## 2020-10-14 NOTE — TELEPHONE ENCOUNTER
Team to let pharmacy know patient needs to send in information before 90 day fill can be done.    Allyssa Becerra RN, [facility] Triage

## 2020-12-04 DIAGNOSIS — I10 ESSENTIAL HYPERTENSION WITH GOAL BLOOD PRESSURE LESS THAN 140/90: ICD-10-CM

## 2020-12-07 RX ORDER — LOSARTAN POTASSIUM 25 MG/1
25 TABLET ORAL DAILY
Qty: 90 TABLET | Refills: 0 | Status: SHIPPED | OUTPATIENT
Start: 2020-12-07

## 2020-12-07 NOTE — TELEPHONE ENCOUNTER
"Requested Prescriptions   Pending Prescriptions Disp Refills     losartan (COZAAR) 25 MG tablet 60 tablet 0     Sig: Take 1 tablet (25 mg) by mouth daily       Angiotensin-II Receptors Passed - 12/4/2020  1:50 PM        Passed - Last blood pressure under 140/90 in past 12 months     BP Readings from Last 3 Encounters:   01/21/20 122/83   01/13/20 (!) 168/65   11/16/16 144/90                 Passed - Recent (12 mo) or future (30 days) visit within the authorizing provider's specialty     Patient has had an office visit with the authorizing provider or a provider within the authorizing providers department within the previous 12 mos or has a future within next 30 days. See \"Patient Info\" tab in inbasket, or \"Choose Columns\" in Meds & Orders section of the refill encounter.              Passed - Medication is active on med list        Passed - Patient is age 18 or older        Passed - No active pregnancy on record        Passed - Normal serum creatinine on file in past 12 months     Recent Labs   Lab Test 08/15/20  1141   CR 0.57       Ok to refill medication if creatinine is low          Passed - Normal serum potassium on file in past 12 months     Recent Labs   Lab Test 08/15/20  1141   POTASSIUM 3.8                    Passed - No positive pregnancy test in past 12 months           Prescription approved per [facility] [place].      Smooth Powell RN, BSN, PHN    "

## (undated) RX ORDER — SIMETHICONE 40MG/0.6ML
SUSPENSION, DROPS(FINAL DOSAGE FORM)(ML) ORAL
Status: DISPENSED
Start: 2020-01-21

## (undated) RX ORDER — FENTANYL CITRATE 50 UG/ML
INJECTION, SOLUTION INTRAMUSCULAR; INTRAVENOUS
Status: DISPENSED
Start: 2020-01-21